# Patient Record
Sex: FEMALE | URBAN - METROPOLITAN AREA
[De-identification: names, ages, dates, MRNs, and addresses within clinical notes are randomized per-mention and may not be internally consistent; named-entity substitution may affect disease eponyms.]

---

## 2017-10-23 NOTE — PATIENT DISCUSSION
(H25.13) Age-related nuclear cataract, bilateral - Assesment : Examination reveals moderate cataract, patient is asymptomatic with visual function minimally affected. - Plan : Patient may consider cataract surgery OU if vision becomes more bothersome. Advised risks and benefits and discussed limiting factors with AMD and glaucoma damages for after cataract surgery. Monitor for changes. Advised patient to call our office with decreased vision or increased symptoms.

## 2017-10-23 NOTE — PATIENT DISCUSSION
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 - Plan : Monitor for changes.

## 2017-10-23 NOTE — PATIENT DISCUSSION
(B43.8107) Nonexudative age-related macular degeneration bilateral inte - Assesment : Examination revealed AMD Dry OU. - Plan : Recommend starting AREDs vitamins. Monitor for changes. Advised patient to call our office with decreased vision or increased distortion.

## 2017-10-23 NOTE — PATIENT DISCUSSION
(T76.4539) Primary open-angle glaucoma bilateral moderate stage - Assesment : Examination revealed Primary Open Angle Glaucoma OU. IOP elevated today OU. Raheem Murillo 74 OCT OU - diffuse NFL loss. Target OU - 15. - Plan : Recommend starting latanoprost QHS OU. Discussed side effects of irritation and darkening of the skin around the eyes. Monitor for changes. Advised patient to call our office when decreased vision or increased eye pain. RV 1 month tn check/MAC OCT. Re check refraction if patient wants to update glasses.

## 2017-11-27 NOTE — PATIENT DISCUSSION
(C24.2225) Primary open-angle glaucoma bilateral moderate stage - Assesment : Examination revealed Primary Open Angle Glaucoma OU. IOP improving but still slightly elevated today OU. Target OU - 15. - Plan : Recommend continue latanoprost QHS OU and starting Dorzolamide BID OU. Discussed side effects of irritation and darkening of the skin around the eyes. Monitor for changes. Advised patient to call our office when decreased vision or increased eye pain. RV 2 months tn check.

## 2017-11-27 NOTE — PATIENT DISCUSSION
(C85.9832) Nonexudative age-related macular degeneration bilateral inte - Assesment : Examination revealed AMD Dry OU. - Plan : Recommend starting AREDs vitamins. (samples given) Monitor for changes. Advised patient to call our office with decreased vision or increased distortion.

## 2018-01-30 NOTE — PATIENT DISCUSSION
(L66.5761) Primary open-angle glaucoma bilateral moderate stage - Assesment : Examination revealed Primary Open Angle Glaucoma OU. Iop much improved since starting Dorzolomide, IOP within normal range on latanoporst and dorszolomide compliance - Plan : Monitor for changes. Advised patient to call our office with decreased vision or an increase in symptoms. Recommend continue latanoprost QHS OU and  Dorzolamide BID OU.   Rtc 2 months Tn anthonyk

## 2018-01-30 NOTE — PATIENT DISCUSSION
(H25.13) Age-related nuclear cataract, bilateral - Assesment : Examination revealed cataract. Advised patient to consider CE she will benefit at this point from having CE Advised patient Cataract OU will not improve , will just continue to progress. Advised patient in some cases CE has helped lower IOP's. Discussed option of LF - Plan : Monitor for changes. Advised patient to call our office with decreased vision or increased symptoms.  Can call and schedule A-scan measurements if wants to proceed

## 2018-02-12 NOTE — PATIENT DISCUSSION
(H35.669) Drusen (degenerative) of macula, bilateral - Assesment : Examination revealed Drusen OU. - Plan : Monitor for changes. Advised patient to call our office with decreased vision or increased symptoms.

## 2018-02-12 NOTE — PATIENT DISCUSSION
(H25.13) Age-related nuclear cataract, bilateral - Assesment : Examination revealed cataract. - Plan : Risks, Benefits and Alternatives were discussed with patient at length for Cataract Surgery. Visual symptoms are consistent with Cataract findings on examination and current refraction no longer provides satisfactory vision. Patient understands and desires surgery. All questions answered. Risks, Benefits and Alternatives discussed at length for IOL placement. Doctor suggests Limited Focus. Patient desires limited focus . Patient will need to wear glasses for distance and near for her best vision.   EYE: OS IOL TYPE: Limited focus POST OPERATIVE TARGET: [place] PACKAGE: None OD to follow  See surgical counselor

## 2018-02-12 NOTE — PATIENT DISCUSSION
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 - Plan : Monitor for changes. Advised patient to call our office with decreased vision or increased symptoms.

## 2018-02-28 NOTE — PATIENT DISCUSSION
(Z96.1) Presence of intraocular lens - Assesment : Patient is Pseudophakic OS. - Plan : Discussed signs and symptoms of infection and retinal detachments. Do not rub operated eye. Follow drop schedule If redness,pain,decreased vision, flashes or floaters occur then contact clinic. Recommend continue latanoprost QHS OU and  Dorzolamide BID OU.

## 2018-03-05 NOTE — PATIENT DISCUSSION
(H25.11) Age-related nuclear cataract, right eye - Assesment : Examination revealed cataract. Patient is symptomatic with visual function affected. - Plan : Risks, Benefits and Alternatives were discussed with patient at length for Cataract Surgery. Visual symptoms are consistent with Cataract findings on examination and current refraction no longer provides satisfactory vision. Patient understands and desires surgery. All questions answered. Risks, Benefits and Alternatives discussed at length for IOL placement. Doctor suggests Limited Focus lens. Patient desires limited focus lens. Patient will need to wear glasses for near, intermediate, distance.   EYE: OD IOL TYPE: Limited focus POST OPERATIVE TARGET: [place] PACKAGE: None

## 2018-03-05 NOTE — PATIENT DISCUSSION
(Z96.1) Presence of intraocular lens - Assesment : Patient is Pseudophakic. - Plan : Discussed signs and symptoms of infection and retinal detachments. Do not rub operated eye. Follow drop schedule If redness,pain,decreased vision, flashes or floaters occur then contact clinic. Recommend continue latanoprost QHS OU and  Dorzolamide BID OU.

## 2018-03-07 NOTE — PATIENT DISCUSSION
(H25.11) Age-related nuclear cataract, right eye - Assesment : Examination revealed cataract. Patient is symptomatic with visual function affected. - Plan : Risks, Benefits and Alternatives were discussed with patient at length for Cataract Surgery. Visual symptoms are consistent with Cataract findings on examination and current refraction no longer provides satisfactory vision. Patient understands and desires surgery. All questions answered. Risks, Benefits and Alternatives discussed at length for IOL placement. Doctor suggests Limited Focus lens. Patient desires limited focus lens. Patient will need to wear glasses for near, intermediate, distance. EYE: OD IOL TYPE: Limited focus POST OPERATIVE TARGET: [place] PACKAGE: None  Recommend postponing sx for another 3 weeks due to recent fall.

## 2018-03-07 NOTE — PATIENT DISCUSSION
(S00.11XA) Contusion of right eyelid and periocular area, init encntr - Assesment : Patient had recent fall. Sinus fracture & orbital wall fracture per ER. Hematoma and Ecchymosis  lids, Laceration right upper brow. No entrapment evident. Patient is seeing ENT Monday, [facility] : Monitor for changes. Recommend postponing cataract surgery OD for 3 weeks.

## 2018-03-07 NOTE — PATIENT DISCUSSION
(S02. 31XA) Fracture of orbital floor right side init - Assesment : Patient has recent fall. - Plan : see plan #1.

## 2018-03-29 NOTE — PATIENT DISCUSSION
(Z96.1) Presence of intraocular lens - Assesment : Patient is Pseudophakic. - Plan : Discussed signs and symptoms of infection and retinal detachments. Do not rub operated eye. Follow drop schedule If redness,pain,decreased vision, flashes or floaters occur then contact clinic. Patient can d/c AREDS. Continue glaucoma drops until IOP is reasonable.   RTC 1 week post-op

## 2018-04-03 NOTE — PATIENT DISCUSSION
(Z96.1) Presence of intraocular lens - Assesment : Patient is Pseudophakic. Healing well - Plan : Discussed signs and symptoms of infection and retinal detachments. Do not rub operated eye. Follow drop schedule If redness,pain,decreased vision, flashes or floaters occur then contact clinic.  Rtc 3-4 weeks final post op

## 2018-04-09 NOTE — PATIENT DISCUSSION
"(U39.5671) Primary open-angle glaucoma bilateral moderate stage - Assesment : Examination revealed Primary Open Angle Glaucoma OU. Some NFL loss confirmed by OCT nerve done on 10/23/17, may be contributing to the transient ""blind spot"" that patient is seeing. - Plan : Continue latanoprost QHS OU and starting Dorzolamide BID OU. Monitor for changes. Advised patient to call our office when decreased vision or increased eye pain. Keep scheduled appointment on 24 April. "

## 2018-04-24 NOTE — PATIENT DISCUSSION
(S00.4507) Primary open-angle glaucoma bilateral moderate stage - Assesment : Examination revealed Primary Open Angle Glaucoma OU. IOP stable S/P CE, recommend D/C Dorzolamide and continuing latanoprost Pt leaving up [facility] : Monitor for changes. Advised patient to call our office with decreased vision or an increase in symptoms.  Recommend continue latanoprost QHS OU  D/C Dorzolamide BID OU Rtc Dec 2018 months Tn anthonyk

## 2018-04-24 NOTE — PATIENT DISCUSSION
(Z96.1) Presence of intraocular lens - Assesment : Patient is Pseudophakic. - Plan : Signs and symptoms of infection and retinal detachment are outlined in your surgical packet. Do not rub operated eye. Follow drop schedule. If redness, pain, decreased vision, flashes or floaters occur then contact clinic.  1 week Refraction

## 2018-04-30 NOTE — PATIENT DISCUSSION
(Z57.652) Dermatochalasis of right upper eyelid - Assesment : Examination revealed Dermatochalasis RUL. - Plan : Monitor for changes. Advised patient to call our office with decreased vision or increased symptoms.

## 2018-04-30 NOTE — PATIENT DISCUSSION
(M60.712) Dermatochalasis of left upper eyelid - Assesment : Examination revealed Dermatochalasis ABHISHEK.  - Plan : See plan # 2

## 2018-04-30 NOTE — PATIENT DISCUSSION
(H35.704) Drusen (degenerative) of macula, bilateral - Assesment : Examination revealed Drusen OU. - Plan : Monitor for changes. Advised patient to call our office with decreased vision or increased symptoms.

## 2018-04-30 NOTE — PATIENT DISCUSSION
(Z96.1) Presence of intraocular lens - Assesment : Patient is Pseudophakic OU. AMD limits visual acuity. Prescription glasses will not help with vision. - Plan : Signs and symptoms of infection and retinal detachment are outlined in your surgical packet. Follow drop schedule. If redness, pain, decreased vision, flashes or floaters occur then contact clinic. o prescription glasses needed at this time. Recommend the us e of +3.00 OTC for reading.   RTC 6 month  follow up/OCT MAC/ONH

## 2018-10-31 NOTE — PATIENT DISCUSSION
(S56.1866) Nonexudative age-related macular degeneration bilateral jean claude - Assesment : Examination revealed AMD Dry. OCT performed today with some progression,but not significant - Plan : Monitor for changes. Advised patient to call our office with decreased vision or increased distortion.  6 months Exam-VF 24/OCTM

## 2018-10-31 NOTE — PATIENT DISCUSSION
(Z95.0674) Primary open-angle glaucoma bilateral moderate stage - Assesment : Examination revealed Primary Open Angle Glaucoma OU. IOP stable Ou on gtts compliance - Plan : Monitor for changes. Advised patient to call our office with decreased vision or an increase in symptoms.  Recommend continue latanoprost QHS OU

## 2018-10-31 NOTE — PATIENT DISCUSSION
(H35.373) Puckering of macula, bilateral - Assesment : Examination revealed ERM. Stable - Plan : Monitor. Advised patient to call our office with decreased vision or increased symptoms.

## 2019-02-27 ENCOUNTER — APPOINTMENT (RX ONLY)
Dept: URBAN - METROPOLITAN AREA CLINIC 48 | Facility: CLINIC | Age: 29
Setting detail: DERMATOLOGY
End: 2019-02-27

## 2019-02-27 DIAGNOSIS — L82.1 OTHER SEBORRHEIC KERATOSIS: ICD-10-CM

## 2019-02-27 DIAGNOSIS — L57.8 OTHER SKIN CHANGES DUE TO CHRONIC EXPOSURE TO NONIONIZING RADIATION: ICD-10-CM

## 2019-02-27 DIAGNOSIS — D18.0 HEMANGIOMA: ICD-10-CM

## 2019-02-27 DIAGNOSIS — L70.0 ACNE VULGARIS: ICD-10-CM

## 2019-02-27 DIAGNOSIS — D22 MELANOCYTIC NEVI: ICD-10-CM

## 2019-02-27 PROBLEM — D22.5 MELANOCYTIC NEVI OF TRUNK: Status: ACTIVE | Noted: 2019-02-27

## 2019-02-27 PROBLEM — F32.9 MAJOR DEPRESSIVE DISORDER, SINGLE EPISODE, UNSPECIFIED: Status: ACTIVE | Noted: 2019-02-27

## 2019-02-27 PROBLEM — D18.01 HEMANGIOMA OF SKIN AND SUBCUTANEOUS TISSUE: Status: ACTIVE | Noted: 2019-02-27

## 2019-02-27 PROBLEM — F41.9 ANXIETY DISORDER, UNSPECIFIED: Status: ACTIVE | Noted: 2019-02-27

## 2019-02-27 PROCEDURE — ? COUNSELING

## 2019-02-27 PROCEDURE — 99203 OFFICE O/P NEW LOW 30 MIN: CPT

## 2019-02-27 PROCEDURE — ? PRESCRIPTION

## 2019-02-27 RX ORDER — ADAPALENE AND BENZOYL PEROXIDE 3; 25 MG/G; MG/G
GEL TOPICAL
Qty: 1 | Refills: 4 | Status: ERX | COMMUNITY
Start: 2019-02-27

## 2019-02-27 RX ADMIN — ADAPALENE AND BENZOYL PEROXIDE 1: 3; 25 GEL TOPICAL at 00:00

## 2019-02-27 ASSESSMENT — LOCATION SIMPLE DESCRIPTION DERM
LOCATION SIMPLE: LEFT LOWER BACK
LOCATION SIMPLE: LEFT UPPER BACK
LOCATION SIMPLE: RIGHT FOREHEAD
LOCATION SIMPLE: ABDOMEN

## 2019-02-27 ASSESSMENT — LOCATION DETAILED DESCRIPTION DERM
LOCATION DETAILED: LEFT SUPERIOR UPPER BACK
LOCATION DETAILED: RIGHT FOREHEAD
LOCATION DETAILED: EPIGASTRIC SKIN
LOCATION DETAILED: LEFT SUPERIOR MEDIAL MIDBACK

## 2019-02-27 ASSESSMENT — LOCATION ZONE DERM
LOCATION ZONE: TRUNK
LOCATION ZONE: FACE

## 2019-02-27 NOTE — PROCEDURE: COUNSELING
Detail Level: Generalized
Detail Level: Zone
Erythromycin Pregnancy And Lactation Text: This medication is Pregnancy Category B and is considered safe during pregnancy. It is also excreted in breast milk.
Bactrim Pregnancy And Lactation Text: This medication is Pregnancy Category D and is known to cause fetal risk. It is also excreted in breast milk.
Use Enhanced Medication Counseling?: No
Topical Retinoid counseling:  Patient advised to apply a pea-sized amount only at bedtime and wait 30 minutes after washing their face before applying. If too drying, patient may add a non-comedogenic moisturizer. The patient verbalized understanding of the proper use and possible adverse effects of retinoids. All of the patient's questions and concerns were addressed.
Topical Clindamycin Counseling: Patient counseled that this medication may cause skin irritation or allergic reactions. In the event of skin irritation, the patient was advised to reduce the amount of the drug applied or use it less frequently. The patient verbalized understanding of the proper use and possible adverse effects of clindamycin. All of the patient's questions and concerns were addressed.
High Dose Vitamin A Pregnancy And Lactation Text: High dose vitamin A therapy is contraindicated during pregnancy and breast feeding.
Bactrim Counseling:  I discussed with the patient the risks of sulfa antibiotics including but not limited to GI upset, allergic reaction, drug rash, diarrhea, dizziness, photosensitivity, and yeast infections. Rarely, more serious reactions can occur including but not limited to aplastic anemia, agranulocytosis, methemoglobinemia, blood dyscrasias, liver or kidney failure, lung infiltrates or desquamative/blistering drug rashes.
Tetracycline Pregnancy And Lactation Text: This medication is Pregnancy Category D and not consider safe during pregnancy. It is also excreted in breast milk.
Doxycycline Counseling:  Patient counseled regarding possible photosensitivity and increased risk for sunburn. Patient instructed to avoid sunlight, if possible. When exposed to sunlight, patients should wear protective clothing, sunglasses, and sunscreen. The patient was instructed to call the office immediately if the following severe adverse effects occur:  hearing changes, easy bruising/bleeding, severe headache, or vision changes. The patient verbalized understanding of the proper use and possible adverse effects of doxycycline. All of the patient's questions and concerns were addressed.
Isotretinoin Counseling: Patient should get monthly blood tests, not donate blood, not drive at night if vision affected, not share medication, and not undergo elective surgery for 6 months after tx completed. Side effects reviewed, pt to contact office should one occur.
Topical Retinoid Pregnancy And Lactation Text: This medication is Pregnancy Category C. It is unknown if this medication is excreted in breast milk.
Minocycline Counseling: Patient advised regarding possible photosensitivity and discoloration of the teeth, skin, lips, tongue and gums. Patient instructed to avoid sunlight, if possible. When exposed to sunlight, patients should wear protective clothing, sunglasses, and sunscreen. The patient was instructed to call the office immediately if the following severe adverse effects occur:  hearing changes, easy bruising/bleeding, severe headache, or vision changes. The patient verbalized understanding of the proper use and possible adverse effects of minocycline. All of the patient's questions and concerns were addressed.
Topical Clindamycin Pregnancy And Lactation Text: This medication is Pregnancy Category B and is considered safe during pregnancy. It is unknown if it is excreted in breast milk.
Dapsone Pregnancy And Lactation Text: This medication is Pregnancy Category C and is not considered safe during pregnancy or breast feeding.
Tetracycline Counseling: Patient counseled regarding possible photosensitivity and increased risk for sunburn. Patient instructed to avoid sunlight, if possible. When exposed to sunlight, patients should wear protective clothing, sunglasses, and sunscreen. The patient was instructed to call the office immediately if the following severe adverse effects occur:  hearing changes, easy bruising/bleeding, severe headache, or vision changes. The patient verbalized understanding of the proper use and possible adverse effects of tetracycline. All of the patient's questions and concerns were addressed. Patient understands to avoid pregnancy while on therapy due to potential birth defects.
Spironolactone Counseling: Patient advised regarding risks of diarrhea, abdominal pain, hyperkalemia, birth defects (for female patients), liver toxicity and renal toxicity. The patient may need blood work to monitor liver and kidney function and potassium levels while on therapy. The patient verbalized understanding of the proper use and possible adverse effects of spironolactone. All of the patient's questions and concerns were addressed.
Doxycycline Pregnancy And Lactation Text: This medication is Pregnancy Category D and not consider safe during pregnancy. It is also excreted in breast milk but is considered safe for shorter treatment courses.
Benzoyl Peroxide Counseling: Patient counseled that medicine may cause skin irritation and bleach clothing. In the event of skin irritation, the patient was advised to reduce the amount of the drug applied or use it less frequently. The patient verbalized understanding of the proper use and possible adverse effects of benzoyl peroxide. All of the patient's questions and concerns were addressed.
Isotretinoin Pregnancy And Lactation Text: This medication is Pregnancy Category X and is considered extremely dangerous during pregnancy. It is unknown if it is excreted in breast milk.
Tazorac Counseling:  Patient advised that medication is irritating and drying. Patient may need to apply sparingly and wash off after an hour before eventually leaving it on overnight. The patient verbalized understanding of the proper use and possible adverse effects of tazorac. All of the patient's questions and concerns were addressed.
Topical Sulfur Applications Counseling: Topical Sulfur Counseling: Patient counseled that this medication may cause skin irritation or allergic reactions. In the event of skin irritation, the patient was advised to reduce the amount of the drug applied or use it less frequently. The patient verbalized understanding of the proper use and possible adverse effects of topical sulfur application. All of the patient's questions and concerns were addressed.
Azithromycin Counseling:  I discussed with the patient the risks of azithromycin including but not limited to GI upset, allergic reaction, drug rash, diarrhea, and yeast infections.
Birth Control Pills Counseling: Birth Control Pill Counseling: I discussed with the patient the potential side effects of OCPs including but not limited to increased risk of stroke, heart attack, thrombophlebitis, deep venous thrombosis, hepatic adenomas, breast changes, GI upset, headaches, and depression. The patient verbalized understanding of the proper use and possible adverse effects of OCPs. All of the patient's questions and concerns were addressed.
Dapsone Counseling: I discussed with the patient the risks of dapsone including but not limited to hemolytic anemia, agranulocytosis, rashes, methemoglobinemia, kidney failure, peripheral neuropathy, headaches, GI upset, and liver toxicity. Patients who start dapsone require monitoring including baseline LFTs and weekly CBCs for the first month, then every month thereafter. The patient verbalized understanding of the proper use and possible adverse effects of dapsone. All of the patient's questions and concerns were addressed.
Spironolactone Pregnancy And Lactation Text: This medication can cause feminization of the male fetus and should be avoided during pregnancy. The active metabolite is also found in breast milk.
Erythromycin Counseling:  I discussed with the patient the risks of erythromycin including but not limited to GI upset, allergic reaction, drug rash, diarrhea, increase in liver enzymes, and yeast infections.
Tazorac Pregnancy And Lactation Text: This medication is not safe during pregnancy. It is unknown if this medication is excreted in breast milk.
Benzoyl Peroxide Pregnancy And Lactation Text: This medication is Pregnancy Category C. It is unknown if benzoyl peroxide is excreted in breast milk.
Azithromycin Pregnancy And Lactation Text: This medication is considered safe during pregnancy and is also secreted in breast milk.
High Dose Vitamin A Counseling: Side effects reviewed, pt to contact office should one occur.
Topical Sulfur Applications Pregnancy And Lactation Text: This medication is Pregnancy Category C and has an unknown safety profile during pregnancy. It is unknown if this topical medication is excreted in breast milk.
Birth Control Pills Pregnancy And Lactation Text: This medication should be avoided if pregnant and for the first 30 days post-partum.

## 2019-03-04 NOTE — PATIENT DISCUSSION
(X13.3793) Primary open-angle glaucoma bilateral moderate stage - Assesment : Examination revealed Primary Open Angle Glaucoma OU. Previous OCTs stable and IOP stable OU with current medication (Latanoprost OU QD). - Plan : Discussed with patient. Recommend patient continue use of Latanoprost OU QD and return in six months for follow up. Monitor for changes. Advised patient to call our office with decreased vision or an increase in symptoms. Rv 6 months follow up/MAC OCT.

## 2019-03-04 NOTE — PATIENT DISCUSSION
(Y03.313) Keratoconjunct sicca, not specified as Sjogren's, bilateral - Assesment : Examination revealed Dry Eye Syndrome OU. - Plan : Monitor for changes. Advised patient to call our office with decreased vision or increased symptoms.

## 2019-03-04 NOTE — PATIENT DISCUSSION
(H71.5772) Nonexudative age-related macular degeneration bilateral jean claude - Assesment : Examination revealed Dry ARMD OU, early stage. Macular OCT performed today shows some progression with drusen and pigment clumping. Fluid visible on OCT today likely associated with ERM. - Plan : Discussed with patient. Patient advised to check [place] [employer] regularly (once weekly or more) and use nutraceuticals such as AREDS 2 eye vitamins. Wear sunglasses when outdoors and eat green, leafy vegetables to maintain ocular health. Continue to monitor for changes. Advised patient to call our office with decreased vision or increased distortion.

## 2019-03-04 NOTE — PATIENT DISCUSSION
(H35.372) Puckering of macula, left eye - Assesment : Examination revealed ERM OS with possible edema. Macular OCT shows fluid OS today. - Plan : Monitor for changes. Advised patient to call our office with decreased vision or increased symptoms.

## 2019-08-06 ENCOUNTER — APPOINTMENT (RX ONLY)
Dept: URBAN - METROPOLITAN AREA CLINIC 48 | Facility: CLINIC | Age: 29
Setting detail: DERMATOLOGY
End: 2019-08-06

## 2019-08-06 DIAGNOSIS — L70.0 ACNE VULGARIS: ICD-10-CM

## 2019-08-06 PROCEDURE — 99212 OFFICE O/P EST SF 10 MIN: CPT

## 2019-08-06 PROCEDURE — ? PRESCRIPTION

## 2019-08-06 PROCEDURE — ? COUNSELING

## 2019-08-06 RX ORDER — ADAPALENE 3 MG/G
1 GEL TOPICAL QD
Qty: 1 | Refills: 3 | Status: ERX | COMMUNITY
Start: 2019-08-06

## 2019-08-06 RX ORDER — CLINDAMYCIN PHOS/BENZOYL PEROX 1.2(1)%-5%
1 GEL (GRAM) TOPICAL QD
Qty: 1 | Refills: 4 | Status: ERX | COMMUNITY
Start: 2019-08-06

## 2019-08-06 RX ORDER — SODIUM SULFACETAMIDE AND SULFUR 80; 40 MG/ML; MG/ML
1 LOTION TOPICAL QD
Qty: 1 | Refills: 5 | Status: ERX | COMMUNITY
Start: 2019-08-06

## 2019-08-06 RX ADMIN — ADAPALENE 1: 3 GEL TOPICAL at 19:18

## 2019-08-06 RX ADMIN — Medication 1: at 19:22

## 2019-08-06 RX ADMIN — SODIUM SULFACETAMIDE AND SULFUR 1: 80; 40 LOTION TOPICAL at 19:16

## 2019-08-06 ASSESSMENT — LOCATION ZONE DERM: LOCATION ZONE: FACE

## 2019-08-06 ASSESSMENT — LOCATION DETAILED DESCRIPTION DERM
LOCATION DETAILED: LEFT INFERIOR CENTRAL MALAR CHEEK
LOCATION DETAILED: RIGHT CENTRAL MALAR CHEEK

## 2019-08-06 ASSESSMENT — LOCATION SIMPLE DESCRIPTION DERM
LOCATION SIMPLE: LEFT CHEEK
LOCATION SIMPLE: RIGHT CHEEK

## 2019-08-06 NOTE — PROCEDURE: COUNSELING
High Dose Vitamin A Counseling: Side effects reviewed, pt to contact office should one occur.
Spironolactone Counseling: Patient advised regarding risks of diarrhea, abdominal pain, hyperkalemia, birth defects (for female patients), liver toxicity and renal toxicity. The patient may need blood work to monitor liver and kidney function and potassium levels while on therapy. The patient verbalized understanding of the proper use and possible adverse effects of spironolactone. All of the patient's questions and concerns were addressed.
Erythromycin Counseling:  I discussed with the patient the risks of erythromycin including but not limited to GI upset, allergic reaction, drug rash, diarrhea, increase in liver enzymes, and yeast infections.
Dapsone Counseling: I discussed with the patient the risks of dapsone including but not limited to hemolytic anemia, agranulocytosis, rashes, methemoglobinemia, kidney failure, peripheral neuropathy, headaches, GI upset, and liver toxicity. Patients who start dapsone require monitoring including baseline LFTs and weekly CBCs for the first month, then every month thereafter. The patient verbalized understanding of the proper use and possible adverse effects of dapsone. All of the patient's questions and concerns were addressed.
Bactrim Counseling:  I discussed with the patient the risks of sulfa antibiotics including but not limited to GI upset, allergic reaction, drug rash, diarrhea, dizziness, photosensitivity, and yeast infections. Rarely, more serious reactions can occur including but not limited to aplastic anemia, agranulocytosis, methemoglobinemia, blood dyscrasias, liver or kidney failure, lung infiltrates or desquamative/blistering drug rashes.
Tazorac Pregnancy And Lactation Text: This medication is not safe during pregnancy. It is unknown if this medication is excreted in breast milk.
Include Pregnancy/Lactation Warning?: No
Spironolactone Pregnancy And Lactation Text: This medication can cause feminization of the male fetus and should be avoided during pregnancy. The active metabolite is also found in breast milk.
Topical Sulfur Applications Pregnancy And Lactation Text: This medication is Pregnancy Category C and has an unknown safety profile during pregnancy. It is unknown if this topical medication is excreted in breast milk.
Benzoyl Peroxide Pregnancy And Lactation Text: This medication is Pregnancy Category C. It is unknown if benzoyl peroxide is excreted in breast milk.
Erythromycin Pregnancy And Lactation Text: This medication is Pregnancy Category B and is considered safe during pregnancy. It is also excreted in breast milk.
Detail Level: Zone
High Dose Vitamin A Pregnancy And Lactation Text: High dose vitamin A therapy is contraindicated during pregnancy and breast feeding.
Dapsone Pregnancy And Lactation Text: This medication is Pregnancy Category C and is not considered safe during pregnancy or breast feeding.
Bactrim Pregnancy And Lactation Text: This medication is Pregnancy Category D and is known to cause fetal risk. It is also excreted in breast milk.
Topical Retinoid counseling:  Patient advised to apply a pea-sized amount only at bedtime and wait 30 minutes after washing their face before applying. If too drying, patient may add a non-comedogenic moisturizer. The patient verbalized understanding of the proper use and possible adverse effects of retinoids. All of the patient's questions and concerns were addressed.
Minocycline Counseling: Patient advised regarding possible photosensitivity and discoloration of the teeth, skin, lips, tongue and gums. Patient instructed to avoid sunlight, if possible. When exposed to sunlight, patients should wear protective clothing, sunglasses, and sunscreen. The patient was instructed to call the office immediately if the following severe adverse effects occur:  hearing changes, easy bruising/bleeding, severe headache, or vision changes. The patient verbalized understanding of the proper use and possible adverse effects of minocycline. All of the patient's questions and concerns were addressed.
Topical Clindamycin Counseling: Patient counseled that this medication may cause skin irritation or allergic reactions. In the event of skin irritation, the patient was advised to reduce the amount of the drug applied or use it less frequently. The patient verbalized understanding of the proper use and possible adverse effects of clindamycin. All of the patient's questions and concerns were addressed.
Tetracycline Counseling: Patient counseled regarding possible photosensitivity and increased risk for sunburn. Patient instructed to avoid sunlight, if possible. When exposed to sunlight, patients should wear protective clothing, sunglasses, and sunscreen. The patient was instructed to call the office immediately if the following severe adverse effects occur:  hearing changes, easy bruising/bleeding, severe headache, or vision changes. The patient verbalized understanding of the proper use and possible adverse effects of tetracycline. All of the patient's questions and concerns were addressed. Patient understands to avoid pregnancy while on therapy due to potential birth defects.
Birth Control Pills Counseling: Birth Control Pill Counseling: I discussed with the patient the potential side effects of OCPs including but not limited to increased risk of stroke, heart attack, thrombophlebitis, deep venous thrombosis, hepatic adenomas, breast changes, GI upset, headaches, and depression. The patient verbalized understanding of the proper use and possible adverse effects of OCPs. All of the patient's questions and concerns were addressed.
Azithromycin Counseling:  I discussed with the patient the risks of azithromycin including but not limited to GI upset, allergic reaction, drug rash, diarrhea, and yeast infections.
Doxycycline Counseling:  Patient counseled regarding possible photosensitivity and increased risk for sunburn. Patient instructed to avoid sunlight, if possible. When exposed to sunlight, patients should wear protective clothing, sunglasses, and sunscreen. The patient was instructed to call the office immediately if the following severe adverse effects occur:  hearing changes, easy bruising/bleeding, severe headache, or vision changes. The patient verbalized understanding of the proper use and possible adverse effects of doxycycline. All of the patient's questions and concerns were addressed.
Isotretinoin Counseling: Patient should get monthly blood tests, not donate blood, not drive at night if vision affected, not share medication, and not undergo elective surgery for 6 months after tx completed. Side effects reviewed, pt to contact office should one occur.
Topical Retinoid Pregnancy And Lactation Text: This medication is Pregnancy Category C. It is unknown if this medication is excreted in breast milk.
Topical Clindamycin Pregnancy And Lactation Text: This medication is Pregnancy Category B and is considered safe during pregnancy. It is unknown if it is excreted in breast milk.
Minocycline Pregnancy And Lactation Text: This medication is Pregnancy Category D and not consider safe during pregnancy. It is also excreted in breast milk.
Isotretinoin Pregnancy And Lactation Text: This medication is Pregnancy Category X and is considered extremely dangerous during pregnancy. It is unknown if it is excreted in breast milk.
Doxycycline Pregnancy And Lactation Text: This medication is Pregnancy Category D and not consider safe during pregnancy. It is also excreted in breast milk but is considered safe for shorter treatment courses.
Azithromycin Pregnancy And Lactation Text: This medication is considered safe during pregnancy and is also secreted in breast milk.
Birth Control Pills Pregnancy And Lactation Text: This medication should be avoided if pregnant and for the first 30 days post-partum.
Topical Sulfur Applications Counseling: Topical Sulfur Counseling: Patient counseled that this medication may cause skin irritation or allergic reactions. In the event of skin irritation, the patient was advised to reduce the amount of the drug applied or use it less frequently. The patient verbalized understanding of the proper use and possible adverse effects of topical sulfur application. All of the patient's questions and concerns were addressed.
Benzoyl Peroxide Counseling: Patient counseled that medicine may cause skin irritation and bleach clothing. In the event of skin irritation, the patient was advised to reduce the amount of the drug applied or use it less frequently. The patient verbalized understanding of the proper use and possible adverse effects of benzoyl peroxide. All of the patient's questions and concerns were addressed.
Tazorac Counseling:  Patient advised that medication is irritating and drying. Patient may need to apply sparingly and wash off after an hour before eventually leaving it on overnight. The patient verbalized understanding of the proper use and possible adverse effects of tazorac. All of the patient's questions and concerns were addressed.

## 2020-10-08 ENCOUNTER — LASIK CONSULTATION (OUTPATIENT)
Dept: URBAN - METROPOLITAN AREA CLINIC 39 | Facility: CLINIC | Age: 30
End: 2020-10-08

## 2020-10-08 DIAGNOSIS — H52.13: ICD-10-CM

## 2020-10-08 DIAGNOSIS — H52.7: ICD-10-CM

## 2020-10-08 DIAGNOSIS — Z97.3: ICD-10-CM

## 2020-10-08 PROCEDURE — 92025 CPTRIZED CORNEAL TOPOGRAPHY: CPT

## 2020-10-08 PROCEDURE — 92134 CPTRZ OPH DX IMG PST SGM RTA: CPT

## 2020-10-08 PROCEDURE — 99499LK REFRACTIVE CONSULT/LASIK

## 2020-10-08 RX ORDER — FLUOROMETHOLONE 1 MG/ML: 1 SUSPENSION/ DROPS OPHTHALMIC

## 2020-10-08 RX ORDER — MOXIFLOXACIN HYDROCHLORIDE 5 MG/ML: 1 SOLUTION/ DROPS OPHTHALMIC

## 2020-10-08 ASSESSMENT — KERATOMETRY
OS_AXISANGLE2_DEGREES: 92
OD_K1POWER_DIOPTERS: 45.25
OS_K1POWER_DIOPTERS: 44.5
OS_K2POWER_DIOPTERS: 45.25
OD_AXISANGLE2_DEGREES: 90
OD_K2POWER_DIOPTERS: 45.25
OS_AXISANGLE_DEGREES: 2
OD_AXISANGLE_DEGREES: 180

## 2020-10-08 ASSESSMENT — TONOMETRY
OD_IOP_MMHG: 14
OS_IOP_MMHG: 15

## 2020-10-08 ASSESSMENT — VISUAL ACUITY
OD_SC: CF 5FT
OD_CC: J1
OS_CC: J1
OD_SC: J1
OS_SC: 20/400
OS_SC: J1

## 2020-11-03 ENCOUNTER — SURGERY/PROCEDURE (OUTPATIENT)
Dept: URBAN - METROPOLITAN AREA CLINIC 39 | Facility: CLINIC | Age: 30
End: 2020-11-03

## 2020-11-03 DIAGNOSIS — H52.7: ICD-10-CM

## 2020-11-03 PROCEDURE — 66999CEL EPI-LASEK (CONVENTIONAL EPI-LASIK)

## 2020-11-04 ASSESSMENT — KERATOMETRY
OD_AXISANGLE2_DEGREES: 90
OD_K2POWER_DIOPTERS: 45.25
OD_AXISANGLE_DEGREES: 180
OS_AXISANGLE_DEGREES: 2
OS_AXISANGLE2_DEGREES: 92
OS_K1POWER_DIOPTERS: 44.5
OD_K1POWER_DIOPTERS: 45.25
OS_K2POWER_DIOPTERS: 45.25

## 2021-12-28 NOTE — PROCEDURE NOTE: CLINICAL
PROCEDURE NOTE: Avastin 1.25mg OD. Diagnosis: Neovascular AMD with Active CNV. Anesthesia: Lidocaine 4%. Prep: Betadine Drops. Prior to injection, risks/benefits/alternatives discussed including infection, loss of vision, hemorrhage, cataract, glaucoma, retinal tears or detachment. The off-label status of Intravitreal Avastin also was reviewed. The patient wished to proceed with treatment. Topical anesthesia was induced with Alcaine. Additional anesthesia was achieved using drop(s) or injection checked above. A drop of Povidone-iodine 5% ophthalmic solution was instilled over the injection site and in the inferior fornix. Betadine prep was performed. Using the syringe provided, Avastin 1.25 mg in 0.05 cc was injected into the vitreous cavity. The needle was passed 3.0 mm posterior to the limbus in pseudophakic patients, and 3.5 mm posterior to the limbus in phakic patients. Patient tolerated procedure well. There were no complications. Injection time: *. Lot #: [id number]. Expiration Date: 3/8/2022. Post-op instructions given. Inventory Id: null. The patient was instructed to return for re-evaluation in approximately 4-12 weeks depending on his/her condition and was told to call immediately if vision decreases and/or if his/her eye becomes red, painful, and/or light sensitive. The patient was instructed to go to the emergency room or call 911 if unable to reach the doctor within an hour or two of trying or calling. The patient was instructed to use Artificial Tears q.i.d. p.r.n for comfort. Hugo Canada PROCEDURE NOTE: Avastin 1.25mg OS. Diagnosis: Neovascular AMD with Active CNV. Anesthesia: Lidocaine 4%. Prep: Betadine Drops. Prior to injection, risks/benefits/alternatives discussed including infection, loss of vision, hemorrhage, cataract, glaucoma, retinal tears or detachment. The off-label status of Intravitreal Avastin also was reviewed. The patient wished to proceed with treatment. Topical anesthesia was induced with Alcaine. Additional anesthesia was achieved using drop(s) or injection checked above. A drop of Povidone-iodine 5% ophthalmic solution was instilled over the injection site and in the inferior fornix. Betadine prep was performed. Using the syringe provided, Avastin 1.25 mg in 0.05 cc was injected into the vitreous cavity. The needle was passed 3.0 mm posterior to the limbus in pseudophakic patients, and 3.5 mm posterior to the limbus in phakic patients. Patient tolerated procedure well. There were no complications. Injection time: *. Lot #: [id number]. Expiration Date: 3/8/2022. Post-op instructions given. Inventory Id: null. The patient was instructed to return for re-evaluation in approximately 4-12 weeks depending on his/her condition and was told to call immediately if vision decreases and/or if his/her eye becomes red, painful, and/or light sensitive. The patient was instructed to go to the emergency room or call 911 if unable to reach the doctor within an hour or two of trying or calling. The patient was instructed to use Artificial Tears q.i.d. p.r.n for comfort. Hugo Canada

## 2021-12-28 NOTE — PATIENT DISCUSSION
12/28/21: PT HAS RECEIVED INJECTIONS SEPARATELY EVERY 8 WKS. DISCUSSED THAT AT HER AGE WITH DEMENTIA MY MAIN GOAL IS COMFORT AND AVOIDING PROGRESSION OF DISEASE WITHOUT CAUSING INCREASED BURDEN.

## 2021-12-28 NOTE — PATIENT DISCUSSION
12/28/21: FALL 11/11. PER DAUGHTER'S REPORT SHE WENT TO ER AND SCANS FAILED TO SHOW ANY FRACTURE. NO STEP OFFS NOTICED. MINIMAL PROPTOSIS. DERMATOCHALASIS AND BROW PTOSIS IN OD. NO INFLAMMATION SEEN.

## 2022-08-04 NOTE — PATIENT DISCUSSION
Recommended OBSERVATION and MONITORING for change. Dilation Curettage Hysteroscopy, polypectom6 wit  myosure.     CBC bMP HgbA1C EKG    preop isntructions given and explained Dilation Curettage Hysteroscopy, polypectomy with Myosure.     CBC BMP HgbA1C EKG    preop instructions given and explained Dilation Curettage Hysteroscopy, polypectomy with Myosure.     CBC BMP HgbA1C EKG    preop instructions given and explained      Loose tooth:  Pt reports she will see dentist at [facility] for extraction of tooth.  daughter reports she will arrange  the appt for pt.